# Patient Record
Sex: FEMALE
[De-identification: names, ages, dates, MRNs, and addresses within clinical notes are randomized per-mention and may not be internally consistent; named-entity substitution may affect disease eponyms.]

---

## 2019-10-13 ENCOUNTER — TRANSCRIPTION ENCOUNTER (OUTPATIENT)
Age: 49
End: 2019-10-13

## 2019-10-16 ENCOUNTER — APPOINTMENT (OUTPATIENT)
Dept: ORTHOPEDIC SURGERY | Facility: CLINIC | Age: 49
End: 2019-10-16
Payer: COMMERCIAL

## 2019-10-16 VITALS
WEIGHT: 200 LBS | SYSTOLIC BLOOD PRESSURE: 118 MMHG | HEIGHT: 66 IN | DIASTOLIC BLOOD PRESSURE: 75 MMHG | BODY MASS INDEX: 32.14 KG/M2 | HEART RATE: 80 BPM

## 2019-10-16 DIAGNOSIS — S93.529A SPRAIN OF METATARSOPHALANGEAL JOINT OF UNSPECIFIED TOE(S), INITIAL ENCOUNTER: ICD-10-CM

## 2019-10-16 DIAGNOSIS — M79.675 PAIN IN LEFT TOE(S): ICD-10-CM

## 2019-10-16 PROCEDURE — 99204 OFFICE O/P NEW MOD 45 MIN: CPT

## 2019-10-16 NOTE — CONSULT LETTER
[Consult Letter:] : I had the pleasure of evaluating your patient, [unfilled]. [Please see my note below.] : Please see my note below. [Consult Closing:] : Thank you very much for allowing me to participate in the care of this patient.  If you have any questions, please do not hesitate to contact me. [Sincerely,] : Sincerely, [FreeTextEntry3] : Amando Loyola, DO\par Foot and Ankle Surgery\par

## 2019-10-16 NOTE — PHYSICAL EXAM
[de-identified] : General: Alert and oriented x3. In no acute distress. Pleasant in nature with a normal affect. No apparent respiratory distress.\par L Foot Exam\par Skin: Clean, dry, intact\par Inspection: No obvious malalignment, no masses, no swelling, no effusion\par Pulses: 2+ DP/PT pulses\par ROM: FOOT Full ROM of digits, ANKLE 10 degrees of dorsiflexion, 40 degrees of plantarflexion, 10 degrees of subtalar motion.\par Painful ROM: None\par Tenderness: +dorsal pain. No tenderness over the medial malleolus, No tenderness over the lateral malleolus, no CFL/ATFL/PTFL pain, no deltoid ligament pain. No heel pain. No Achilles tenderness. No 5th metatarsal pain. No pain to the LisFranc joint. No ttp over the posterior tibial tendon.\par Stability: Positive drawer.\par Strength: 5/5 ADD/ABD/TA/GS/EHL/FHL/EDL\par Neuro: Sensation in tact to light touch throughout\par Additional tests: Negative Mortons test, negative tarsal tunnel tinels, negative single heel rise.\par \par L Big Toe Exam\par ROM Great toe: 70 degrees of dorsiflexion, plantar pain over medial head of metatarsal. [de-identified] : X-rays of the left foot obtained from outside facility on 10/13/19 and reviewed by me today 10/16/2019. Revealed: There is no evidence of acute fracture or dislocation. Joint spaces are preserved. 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

## 2019-10-16 NOTE — DISCUSSION/SUMMARY
[de-identified] : Today I had a lengthy discussion with the patient regarding their left foot pain.I have addressed all the patient's concerns surrounding the pathology of their condition. I recommend that the patient utilize a stiff shoe. I also recommend that the patient utilize a [place] extension plate. She was shown the extension plate in the office today.  I recommend that the patient utilize NSAIDs PRN. I would like to see the patient back in the office in 2 months to reassess their condition. The patient understood and verbally agreed to the treatment plan. All of their questions were answered and they were satisfied with the visit. The patient should call the office if they have any questions or experience worsening symptoms.\par \par \par

## 2019-10-16 NOTE — HISTORY OF PRESENT ILLNESS
[FreeTextEntry1] : 49 year old female presenting with left foot pain. The patient’s pain is noted to be a 6-7/10. The patient's pain began on 10/11/19 when she slipped and fell, and landed on her big toe. She notes the toe started to swell. The patient describes their pain as throbbing and radiating. The patient had x-rays done at an outside facility. She is currently taking no pain medication. No other complaints at this time.\par \par \par

## 2019-10-16 NOTE — ADDENDUM
[FreeTextEntry1] : I, Junior Matty, acted solely as a scribe for Dr. Amando Loyola on this date 10/16/2019 .\par All medical record entries made by the Scribe were at my, Dr. Amando Loyola, direction and personally dictated by me on 10/16/2019 . I have reviewed the chart and agree that the record accurately reflects my personal performance of the history, physical exam, assessment and plan. I have also personally directed, reviewed, and agreed with the chart.\par \par \par

## 2019-12-18 ENCOUNTER — APPOINTMENT (OUTPATIENT)
Dept: ORTHOPEDIC SURGERY | Facility: CLINIC | Age: 49
End: 2019-12-18

## 2023-12-27 ENCOUNTER — APPOINTMENT (OUTPATIENT)
Dept: ORTHOPEDIC SURGERY | Facility: CLINIC | Age: 53
End: 2023-12-27
Payer: OTHER MISCELLANEOUS

## 2023-12-27 ENCOUNTER — APPOINTMENT (OUTPATIENT)
Dept: MRI IMAGING | Facility: CLINIC | Age: 53
End: 2023-12-27
Payer: OTHER MISCELLANEOUS

## 2023-12-27 VITALS — BODY MASS INDEX: 32.14 KG/M2 | WEIGHT: 200 LBS | HEIGHT: 66 IN

## 2023-12-27 DIAGNOSIS — Z78.9 OTHER SPECIFIED HEALTH STATUS: ICD-10-CM

## 2023-12-27 DIAGNOSIS — M25.562 PAIN IN LEFT KNEE: ICD-10-CM

## 2023-12-27 PROCEDURE — 73721 MRI JNT OF LWR EXTRE W/O DYE: CPT | Mod: 1L,LT

## 2023-12-27 PROCEDURE — 73562 X-RAY EXAM OF KNEE 3: CPT | Mod: LT

## 2023-12-27 PROCEDURE — 99203 OFFICE O/P NEW LOW 30 MIN: CPT

## 2023-12-27 RX ORDER — DICLOFENAC SODIUM 75 MG/1
75 TABLET, DELAYED RELEASE ORAL
Qty: 60 | Refills: 2 | Status: ACTIVE | COMMUNITY
Start: 2023-12-27 | End: 1900-01-01

## 2023-12-27 NOTE — PHYSICAL EXAM
[NL (0)] : extension 0 degrees [5___] : quadriceps 5[unfilled]/5 [] : mildly antalgic [Left] : left knee [AP] : anteroposterior [Lateral] : lateral [[place]] : skyline [There are no fractures, subluxations or dislocations. No significant abnormalities are seen] : There are no fractures, subluxations or dislocations. No significant abnormalities are seen [FreeTextEntry3] : resolving ecchymosis anterior leg, small soft tissue mass/ swelling over tubercle area.  [FreeTextEntry9] : anterior soft tissue swelling [TWNoteComboBox7] : flexion 130 degrees

## 2023-12-27 NOTE — REASON FOR VISIT
[FreeTextEntry2] : Patient is a 53 year old female , who was on the ground with her knees bent during a training session at work on 12/11/23 Patient injured her L Knee getting up. Patient complains of pain and swelling. Patient is working. Had prior knee pain. Took some advil for few days.

## 2023-12-27 NOTE — HISTORY OF PRESENT ILLNESS
[Work related] : work related [Gradual] : gradual [6] : 6 [2] : 2 [Localized] : localized [Throbbing] : throbbing [Frequent] : frequent [Household chores] : household chores [Leisure] : leisure [Rest] : rest [Standing] : standing [Lying in bed] : lying in bed [Full time] : Work status: full time [] : Post Surgical Visit: no [FreeTextEntry1] : L Knee  [FreeTextEntry3] : 12/11/2023 [de-identified] :

## 2023-12-27 NOTE — ASSESSMENT
[FreeTextEntry1] : patient is working, may continue. MRI left knee is ordered to evaluate the mass. Instructed on ice and NSAIDs.

## 2023-12-29 ENCOUNTER — APPOINTMENT (OUTPATIENT)
Dept: ORTHOPEDIC SURGERY | Facility: CLINIC | Age: 53
End: 2023-12-29
Payer: OTHER MISCELLANEOUS

## 2023-12-29 VITALS — BODY MASS INDEX: 32.14 KG/M2 | WEIGHT: 200 LBS | HEIGHT: 66 IN

## 2023-12-29 PROCEDURE — 99214 OFFICE O/P EST MOD 30 MIN: CPT

## 2023-12-29 NOTE — HISTORY OF PRESENT ILLNESS
[Work related] : work related [Gradual] : gradual [5] : 5 [Dull/Aching] : dull/aching [Intermittent] : intermittent [] : Post Surgical Visit: no [FreeTextEntry3] : 12/11/23 [de-identified] : 12/27/23 [de-identified] : Dr. Brewer

## 2023-12-29 NOTE — PHYSICAL EXAM
[Left] : left knee [NL (0)] : extension 0 degrees [5___] : quadriceps 5[unfilled]/5 [] : mildly antalgic [FreeTextEntry3] : resolving ecchymosis anterior leg, small soft tissue mass/ swelling over tubercle area.  [TWNoteComboBox7] : flexion 130 degrees

## 2023-12-29 NOTE — ASSESSMENT
[FreeTextEntry1] : MRI results discussed in detail. Recommend heat and ice, ointments. PT prescribed. Is working full duty. Continue with the diclofenac.

## 2024-01-31 ENCOUNTER — APPOINTMENT (OUTPATIENT)
Dept: ORTHOPEDIC SURGERY | Facility: CLINIC | Age: 54
End: 2024-01-31
Payer: OTHER MISCELLANEOUS

## 2024-01-31 VITALS — WEIGHT: 200 LBS | HEIGHT: 66 IN | BODY MASS INDEX: 32.14 KG/M2

## 2024-01-31 DIAGNOSIS — M76.52 PATELLAR TENDINITIS, LEFT KNEE: ICD-10-CM

## 2024-01-31 DIAGNOSIS — M17.12 UNILATERAL PRIMARY OSTEOARTHRITIS, LEFT KNEE: ICD-10-CM

## 2024-01-31 DIAGNOSIS — M25.562 PAIN IN LEFT KNEE: ICD-10-CM

## 2024-01-31 DIAGNOSIS — M25.462 PAIN IN LEFT KNEE: ICD-10-CM

## 2024-01-31 PROCEDURE — 99213 OFFICE O/P EST LOW 20 MIN: CPT

## 2024-01-31 NOTE — REASON FOR VISIT
[FreeTextEntry2] : Patient feels improvement since last visit. Decreased pain L Knee. Some mild discomfort with increased activity or at the end of the day. Did not start PT yet, takes diclofenac at night.

## 2024-01-31 NOTE — ASSESSMENT
[FreeTextEntry1] : prescription for PT given; will start soon; is working without restrictions; continue with diclofenac.

## 2024-01-31 NOTE — HISTORY OF PRESENT ILLNESS
[Work related] : work related [Gradual] : gradual [3] : 3 [0] : 0 [Dull/Aching] : dull/aching [Intermittent] : intermittent [Leisure] : leisure [Rest] : rest [Ice] : ice [Walking] : walking [Exercising] : exercising [Stairs] : stairs [Full time] : Work status: full time [] : Post Surgical Visit: no [FreeTextEntry1] : L Knee  [FreeTextEntry3] : 12/11/2023 [de-identified] : 12/29/2023 [de-identified] : Dr. Hammonds  [de-identified] :

## 2024-03-05 ENCOUNTER — APPOINTMENT (OUTPATIENT)
Dept: ORTHOPEDIC SURGERY | Facility: CLINIC | Age: 54
End: 2024-03-05